# Patient Record
Sex: FEMALE | Race: WHITE | Employment: OTHER | URBAN - METROPOLITAN AREA
[De-identification: names, ages, dates, MRNs, and addresses within clinical notes are randomized per-mention and may not be internally consistent; named-entity substitution may affect disease eponyms.]

---

## 2017-01-31 ENCOUNTER — OFFICE VISIT - RIVER FALLS (OUTPATIENT)
Dept: FAMILY MEDICINE | Facility: CLINIC | Age: 72
End: 2017-01-31

## 2017-11-14 ENCOUNTER — OFFICE VISIT - RIVER FALLS (OUTPATIENT)
Dept: FAMILY MEDICINE | Facility: CLINIC | Age: 72
End: 2017-11-14

## 2017-11-14 ASSESSMENT — MIFFLIN-ST. JEOR: SCORE: 1349.44

## 2017-11-30 ENCOUNTER — OFFICE VISIT - RIVER FALLS (OUTPATIENT)
Dept: FAMILY MEDICINE | Facility: CLINIC | Age: 72
End: 2017-11-30

## 2017-11-30 ASSESSMENT — MIFFLIN-ST. JEOR: SCORE: 1344.91

## 2017-12-01 LAB
CREAT SERPL-MCNC: 0.77 MG/DL (ref 0.6–0.93)
GLUCOSE BLD-MCNC: 89 MG/DL (ref 65–99)

## 2017-12-20 ENCOUNTER — OFFICE VISIT - RIVER FALLS (OUTPATIENT)
Dept: FAMILY MEDICINE | Facility: CLINIC | Age: 72
End: 2017-12-20

## 2017-12-20 ASSESSMENT — MIFFLIN-ST. JEOR: SCORE: 1362.14

## 2017-12-27 ENCOUNTER — OFFICE VISIT - RIVER FALLS (OUTPATIENT)
Dept: FAMILY MEDICINE | Facility: CLINIC | Age: 72
End: 2017-12-27

## 2017-12-27 ASSESSMENT — MIFFLIN-ST. JEOR: SCORE: 1344.91

## 2021-08-19 ENCOUNTER — OFFICE VISIT - RIVER FALLS (OUTPATIENT)
Dept: FAMILY MEDICINE | Facility: CLINIC | Age: 76
End: 2021-08-19

## 2021-08-19 LAB
BUN SERPL-MCNC: 15 MG/DL (ref 7–25)
BUN/CREAT RATIO - HISTORICAL: NORMAL (ref 6–22)
CALCIUM SERPL-MCNC: 9.2 MG/DL (ref 8.6–10.4)
CHLORIDE BLD-SCNC: 104 MMOL/L (ref 98–110)
CO2 SERPL-SCNC: 27 MMOL/L (ref 20–32)
CREAT SERPL-MCNC: 0.84 MG/DL (ref 0.6–0.93)
EGFRCR SERPLBLD CKD-EPI 2021: 69 ML/MIN/1.73M2
ERYTHROCYTE [DISTWIDTH] IN BLOOD BY AUTOMATED COUNT: 14.5 % (ref 11–15)
GLUCOSE BLD-MCNC: 78 MG/DL (ref 65–99)
HCT VFR BLD AUTO: 42 % (ref 35–45)
HGB BLD-MCNC: 14.3 GM/DL (ref 11.7–15.5)
MCH RBC QN AUTO: 29.7 PG (ref 27–33)
MCHC RBC AUTO-ENTMCNC: 34 GM/DL (ref 32–36)
MCV RBC AUTO: 87.1 FL (ref 80–100)
PLATELET # BLD AUTO: 153 10*3/UL (ref 140–400)
PMV BLD: 9.9 FL (ref 7.5–12.5)
POTASSIUM BLD-SCNC: 3.9 MMOL/L (ref 3.5–5.3)
RBC # BLD AUTO: 4.82 10*6/UL (ref 3.8–5.1)
SODIUM SERPL-SCNC: 138 MMOL/L (ref 135–146)
WBC # BLD AUTO: 6.2 10*3/UL (ref 3.8–10.8)

## 2021-08-19 ASSESSMENT — MIFFLIN-ST. JEOR: SCORE: 1342.76

## 2021-10-07 ENCOUNTER — OFFICE VISIT - RIVER FALLS (OUTPATIENT)
Dept: FAMILY MEDICINE | Facility: CLINIC | Age: 76
End: 2021-10-07

## 2021-10-07 ENCOUNTER — LAB REQUISITION (OUTPATIENT)
Dept: LAB | Facility: CLINIC | Age: 76
End: 2021-10-07
Payer: MEDICARE

## 2021-10-07 ENCOUNTER — COMMUNICATION - RIVER FALLS (OUTPATIENT)
Dept: FAMILY MEDICINE | Facility: CLINIC | Age: 76
End: 2021-10-07

## 2021-10-07 DIAGNOSIS — U07.1 COVID-19: ICD-10-CM

## 2021-10-07 PROCEDURE — U0003 INFECTIOUS AGENT DETECTION BY NUCLEIC ACID (DNA OR RNA); SEVERE ACUTE RESPIRATORY SYNDROME CORONAVIRUS 2 (SARS-COV-2) (CORONAVIRUS DISEASE [COVID-19]), AMPLIFIED PROBE TECHNIQUE, MAKING USE OF HIGH THROUGHPUT TECHNOLOGIES AS DESCRIBED BY CMS-2020-01-R: HCPCS | Mod: ORL | Performed by: FAMILY MEDICINE

## 2021-10-07 ASSESSMENT — MIFFLIN-ST. JEOR: SCORE: 1356.37

## 2021-10-08 LAB — SARS-COV-2 RNA RESP QL NAA+PROBE: NEGATIVE

## 2021-10-11 LAB — SARS-COV-2 RNA RESP QL NAA+PROBE: NEGATIVE

## 2021-11-22 ENCOUNTER — TELEPHONE (OUTPATIENT)
Dept: CARDIOLOGY | Facility: CLINIC | Age: 76
End: 2021-11-22
Payer: COMMERCIAL

## 2021-11-22 NOTE — TELEPHONE ENCOUNTER
----- Message from Alessandra Rucker sent at 11/22/2021  3:24 PM CST -----  Regarding: RE: NEW PATIENT REFERRAL  Patient lives in [place] and might try to find care closer to home.  She has my contact if she decides to schedule.  ----- Message -----  From: Mirna Butts RN  Sent: 11/16/2021   9:59 AM CST  To: Alessandra Rucker  Subject: FW: NEW PATIENT REFERRAL                         Okay to schedule into valve clinic. Thanks!    Mirna   ----- Message -----  From: Beth Isaac  Sent: 11/16/2021   8:42 AM CST  To: [facility]  Subject: NEW PATIENT REFERRAL                             NEW PATIENT REFERRAL, DX: AORTIC STENOSIS. THANK YOU, BETH ISAAC.

## 2021-12-07 DIAGNOSIS — I35.0 AORTIC STENOSIS: Primary | ICD-10-CM

## 2021-12-09 ENCOUNTER — LAB (OUTPATIENT)
Dept: CARDIOLOGY | Facility: CLINIC | Age: 76
End: 2021-12-09
Payer: MEDICARE

## 2021-12-09 ENCOUNTER — ALLIED HEALTH/NURSE VISIT (OUTPATIENT)
Dept: CARDIOLOGY | Facility: CLINIC | Age: 76
End: 2021-12-09
Payer: MEDICARE

## 2021-12-09 ENCOUNTER — OFFICE VISIT (OUTPATIENT)
Dept: CARDIOLOGY | Facility: CLINIC | Age: 76
End: 2021-12-09
Payer: MEDICARE

## 2021-12-09 VITALS
SYSTOLIC BLOOD PRESSURE: 138 MMHG | HEIGHT: 62 IN | DIASTOLIC BLOOD PRESSURE: 68 MMHG | RESPIRATION RATE: 16 BRPM | WEIGHT: 202 LBS | HEART RATE: 79 BPM | BODY MASS INDEX: 37.17 KG/M2

## 2021-12-09 DIAGNOSIS — I35.0 NONRHEUMATIC AORTIC VALVE STENOSIS: Primary | ICD-10-CM

## 2021-12-09 DIAGNOSIS — R93.89 ABNORMAL FINDINGS ON DIAGNOSTIC IMAGING OF CARDIOVASCULAR SYSTEM: ICD-10-CM

## 2021-12-09 DIAGNOSIS — I35.0 AORTIC STENOSIS: Primary | ICD-10-CM

## 2021-12-09 DIAGNOSIS — I35.0 AORTIC STENOSIS: ICD-10-CM

## 2021-12-09 PROBLEM — H40.9 GLAUCOMA: Status: ACTIVE | Noted: 2017-12-12

## 2021-12-09 PROBLEM — Z90.49 S/P CHOLECYSTECTOMY: Status: ACTIVE | Noted: 2017-12-12

## 2021-12-09 PROBLEM — E66.01 MORBID OBESITY (H): Status: ACTIVE | Noted: 2017-12-12

## 2021-12-09 PROBLEM — E78.00 HYPERCHOLESTEROLEMIA: Status: ACTIVE | Noted: 2017-12-12

## 2021-12-09 LAB
ALBUMIN SERPL-MCNC: 4.1 G/DL (ref 3.5–5)
ALP SERPL-CCNC: 95 U/L (ref 45–120)
ALT SERPL W P-5'-P-CCNC: 19 U/L (ref 0–45)
ANION GAP SERPL CALCULATED.3IONS-SCNC: 10 MMOL/L (ref 5–18)
AST SERPL W P-5'-P-CCNC: 21 U/L (ref 0–40)
BILIRUB SERPL-MCNC: 2.4 MG/DL (ref 0–1)
BUN SERPL-MCNC: 13 MG/DL (ref 8–28)
CALCIUM SERPL-MCNC: 9.1 MG/DL (ref 8.5–10.5)
CHLORIDE BLD-SCNC: 106 MMOL/L (ref 98–107)
CO2 SERPL-SCNC: 24 MMOL/L (ref 22–31)
CREAT SERPL-MCNC: 0.81 MG/DL (ref 0.6–1.1)
ERYTHROCYTE [DISTWIDTH] IN BLOOD BY AUTOMATED COUNT: 14.4 % (ref 10–15)
GFR SERPL CREATININE-BSD FRML MDRD: 72 ML/MIN/1.73M2
GLUCOSE BLD-MCNC: 84 MG/DL (ref 70–125)
HCT VFR BLD AUTO: 43.5 % (ref 35–47)
HGB BLD-MCNC: 13.9 G/DL (ref 11.7–15.7)
MCH RBC QN AUTO: 28.6 PG (ref 26.5–33)
MCHC RBC AUTO-ENTMCNC: 32 G/DL (ref 31.5–36.5)
MCV RBC AUTO: 90 FL (ref 78–100)
PLATELET # BLD AUTO: 181 10E3/UL (ref 150–450)
POTASSIUM BLD-SCNC: 4.1 MMOL/L (ref 3.5–5)
PROT SERPL-MCNC: 7.5 G/DL (ref 6–8)
RBC # BLD AUTO: 4.86 10E6/UL (ref 3.8–5.2)
SODIUM SERPL-SCNC: 140 MMOL/L (ref 136–145)
WBC # BLD AUTO: 5.5 10E3/UL (ref 4–11)

## 2021-12-09 PROCEDURE — 99205 OFFICE O/P NEW HI 60 MIN: CPT | Performed by: INTERNAL MEDICINE

## 2021-12-09 PROCEDURE — 85027 COMPLETE CBC AUTOMATED: CPT

## 2021-12-09 PROCEDURE — 93000 ELECTROCARDIOGRAM COMPLETE: CPT | Performed by: INTERNAL MEDICINE

## 2021-12-09 PROCEDURE — 80053 COMPREHEN METABOLIC PANEL: CPT

## 2021-12-09 PROCEDURE — 99207 PR NO CHARGE NURSE ONLY: CPT

## 2021-12-09 PROCEDURE — 36415 COLL VENOUS BLD VENIPUNCTURE: CPT

## 2021-12-09 RX ORDER — FUROSEMIDE 20 MG
20 TABLET ORAL DAILY
Qty: 30 TABLET | Refills: 12 | Status: SHIPPED | OUTPATIENT
Start: 2021-12-09 | End: 2023-07-26

## 2021-12-09 ASSESSMENT — MIFFLIN-ST. JEOR: SCORE: 1369.52
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
## 2021-12-09 NOTE — NURSING NOTE
Valve Clinic - Aortic Stenosis  (See consult notes from Dr. Allen)    Referring provider: PCP  Rivera Hernandez    KCQ12 (date completed 21), scanned into chart    Serum albumin (date completed 21): pending  +5 meter walk: uses cane, falls risk  Soliz index (date completed 21): 6/6    frailty score: 1  Preliminary STS Score: 1.9%      Pt is . Retired office work. She had 2 children both . Pt lives with her mom in a single level home in [place], [place]. She is the primary caregiver for her mom. She uses a cane for balance. Pt reports MORALES and has bilateral LE edema.       PMH: HLD, Obesity, recent eyelid surgery        TTE date 21  EF 71% %  AV mean gradient: 37 mmHg  AV Peak Billy: 3.9 m/sec  AV area: 0.96 cm2  AV DIM IND VTI: 0.24  LV SVi: 45 ml/m2        Plan: Eval for possible TAVR. Schedule CT, heart cath and Surgeon consult. Pt will contact dentist for dental clearance. She is the primary caregiver for her mom and states that she is very busy with appointments for her mom.    Reviewed AS/TAVR education information with patient. No further questions at this time. Patient has my direct contact information and was encouraged to call with questions or concerns.           Cory Preciado RN  [facility]  [facility]  Phone: [phone]  Fax: [phone]

## 2021-12-10 LAB
ATRIAL RATE - MUSE: 81 BPM
DIASTOLIC BLOOD PRESSURE - MUSE: NORMAL MMHG
INTERPRETATION ECG - MUSE: NORMAL
P AXIS - MUSE: 31 DEGREES
PR INTERVAL - MUSE: 172 MS
QRS DURATION - MUSE: 92 MS
QT - MUSE: 390 MS
QTC - MUSE: 453 MS
R AXIS - MUSE: -40 DEGREES
SYSTOLIC BLOOD PRESSURE - MUSE: NORMAL MMHG
T AXIS - MUSE: 69 DEGREES
VENTRICULAR RATE- MUSE: 81 BPM

## 2022-02-11 VITALS
BODY MASS INDEX: 37.61 KG/M2 | SYSTOLIC BLOOD PRESSURE: 138 MMHG | HEIGHT: 61 IN | HEIGHT: 61 IN | WEIGHT: 203 LBS | TEMPERATURE: 97.2 F | HEART RATE: 80 BPM | BODY MASS INDEX: 38.33 KG/M2 | SYSTOLIC BLOOD PRESSURE: 132 MMHG | DIASTOLIC BLOOD PRESSURE: 76 MMHG | HEART RATE: 84 BPM | TEMPERATURE: 98.8 F | DIASTOLIC BLOOD PRESSURE: 72 MMHG | WEIGHT: 199.2 LBS

## 2022-02-11 VITALS
DIASTOLIC BLOOD PRESSURE: 82 MMHG | TEMPERATURE: 97.3 F | BODY MASS INDEX: 37.8 KG/M2 | HEART RATE: 80 BPM | SYSTOLIC BLOOD PRESSURE: 132 MMHG | HEIGHT: 61 IN | HEIGHT: 61 IN | DIASTOLIC BLOOD PRESSURE: 68 MMHG | TEMPERATURE: 97.7 F | BODY MASS INDEX: 37.61 KG/M2 | HEART RATE: 72 BPM | WEIGHT: 200.2 LBS | SYSTOLIC BLOOD PRESSURE: 124 MMHG | WEIGHT: 199.2 LBS

## 2022-02-11 VITALS
HEART RATE: 70 BPM | HEIGHT: 61 IN | TEMPERATURE: 97.6 F | WEIGHT: 202.6 LBS | SYSTOLIC BLOOD PRESSURE: 118 MMHG | HEART RATE: 70 BPM | BODY MASS INDEX: 38.25 KG/M2 | OXYGEN SATURATION: 98 % | DIASTOLIC BLOOD PRESSURE: 74 MMHG | SYSTOLIC BLOOD PRESSURE: 118 MMHG | HEIGHT: 61 IN | BODY MASS INDEX: 37.69 KG/M2 | WEIGHT: 199.6 LBS | DIASTOLIC BLOOD PRESSURE: 82 MMHG

## 2022-02-16 NOTE — TELEPHONE ENCOUNTER
---------------------  From: Amber Dupont CMA   To: Phone Messages Pool ([id number] - [place]);     Sent: 8/19/2021 11:35:25 AM CDT  Subject: C-19 testing prior to surgery?     LDVMFCB w/ surgery center @ [facility] asking for update on covid testing prior to procedures at [facility], specifically regarding patient. Scheduled surgery on 8/24/21 w/ Ubaldo SPRINGER on covid vaccines.Steffanie @ [facility] called stating starting this week, everyone who is scheduled for surgery has to be covid tested within 3-5 days of surgery. [facility] informed. They will contact patient to assist in scheduling swabbing through them.

## 2022-02-16 NOTE — PROGRESS NOTES
Patient:   KINGSLEY FRIAS            MRN: [id number]            FIN: [id number]               Age:   72 years     Sex:  Female     :  1945   Associated Diagnoses:   Degenerative joint disease (DJD) of hip   Author:   Rivera Hernandez MD      Chief Complaint   2017 11:35 AM CST  Requesting a letter for post office to deliver mail to house     Chief complaint and symptoms noted above confirmed with patient.      Subjective   Chief complaint 2017 11:35 AM CST  Requesting a letter for post office to deliver mail to house  .  Additional information Having problems with DJD.        Health Status   Allergies:    Allergic Reactions (Selected)  No known allergies  No Known Medication Allergies   Medications:  (Selected)   Documented Medications  Documented  Calcium 600+D: po, daily, 0 Refill(s)  Cosopt ophthalmic solution: 1 drop(s), Both eyes, BID, 0 Refill(s)  Xalatan: Both eyes, BID, 0 Refill(s)   Problem list:    All Problems  Glaucoma / ICD-9-.9 / Confirmed  Elevated cholesterol / SNOMED CT 45801651 / Confirmed  Obesity / SNOMED CT 947498521 / Confirmed      Objective   Vital Signs   2017 11:35 AM CST Temperature Tympanic 97.7 DegF  LOW    Peripheral Pulse Rate 72 bpm    Pulse Site Radial artery    HR Method Manual    Systolic Blood Pressure 124 mmHg    Diastolic Blood Pressure 82 mmHg    Mean Arterial Pressure 96 mmHg    BP Site Left arm    BP Method Manual      Measurements from flowsheet : Measurements   2017 11:35 AM CST Height Measured - Standard 61.25 in    Weight Measured - Standard 200.2 lb    BSA 1.98 m2    Body Mass Index 37.52 kg/m2      General:  Alert and oriented, No acute distress.    Musculoskeletal:       Lower extremity exam: Hip ( Bilateral, Tenderness, Uses cane to walk ).    Integumentary:  Warm, Dry, Pink.       Impression and Plan   Assessment and Plan:          Diagnosis: Degenerative joint disease (DJD) of hip (XXE55-EO M16.9).         Course:  Progressing as expected.    Orders     Please see copy of dictated letter..     .    Assessment and Plan:  Orders     More than half of a 15 minute visit spent on counseling the above problems..     .

## 2022-02-16 NOTE — NURSING NOTE
Comprehensive Intake Entered On:  8/19/2021 11:17 AM CDT    Performed On:  8/19/2021 11:07 AM CDT by Christoph Veliz LPN               Summary   Chief Complaint :   [facility] DOS: [facility]24-21    Advance Directive :   Yes   Weight Measured :   199.6 lb(Converted to: 199 lb 10 oz, 90.537 kg)    Height Measured :   61 in(Converted to: 5 ft 1 in, 154.94 cm)    Body Mass Index :   37.71 kg/m2 (HI)    Body Surface Area :   1.97 m2   Systolic Blood Pressure :   118 mmHg   Diastolic Blood Pressure :   82 mmHg (HI)    Mean Arterial Pressure :   94 mmHg   Peripheral Pulse Rate :   70 bpm   BP Site :   Right arm   Pulse Site :   Radial artery   BP Method :   Manual   HR Method :   Manual   Christoph Veliz LPN - 8/19/2021 11:07 AM CDT   Consents   Consent for Immunization Exchange :   Consent Granted   Consent for Immunizations to Providers :   Consent Granted   Christoph Veliz LPN - 8/19/2021 11:07 AM CDT   Meds / Allergies   (As Of: 8/19/2021 11:17:09 AM CDT)   Allergies (Active)   No known allergies  Estimated Onset Date:   Unspecified ; Created By:   Irene Dunn CMA; Reaction Status:   Active ; Category:   Drug ; Substance:   No known allergies ; Type:   Allergy ; Updated By:   Irene Dunn CMA; Reviewed Date:   8/19/2021 11:08 AM CDT      No Known Medication Allergies  Estimated Onset Date:   Unspecified ; Created By:   Irene Dunn CMA; Reaction Status:   Active ; Category:   Drug ; Substance:   No Known Medication Allergies ; Type:   Allergy ; Updated By:   Irene Dunn CMA; Reviewed Date:   8/19/2021 11:08 AM CDT        Medication List   (As Of: 8/19/2021 11:17:09 AM CDT)   Prescription/Discharge Order    cholestyramine  :   cholestyramine ; Status:   Completed ; Ordered As Mnemonic:   cholestyramine 4 g/5 g oral powder for reconstitution ; Simple Display Line:   5 g, PO, BID, 210 g, 5 Refill(s) ; Ordering Provider:   Rivera Hernandez MD; Catalog Code:   cholestyramine  ; Order Dt/Tm:   12/20/2017 10:21:42 AM CST            Home Meds    calcium-vitamin D  :   calcium-vitamin D ; Status:   Processing ; Ordered As Mnemonic:   Calcium 600+D ; Action Display:   Complete ; Catalog Code:   calcium-vitamin D ; Order Dt/Tm:   8/19/2021 11:14:51 AM CDT          dorzolamide-timolol ophthalmic  :   dorzolamide-timolol ophthalmic ; Status:   Documented ; Ordered As Mnemonic:   Cosopt ophthalmic solution ; Simple Display Line:   1 drop(s), Both eyes, BID ; Catalog Code:   dorzolamide-timolol ophthalmic ; Order Dt/Tm:   4/8/2010 10:55:22 AM CDT          latanoprost ophthalmic  :   latanoprost ophthalmic ; Status:   Documented ; Ordered As Mnemonic:   Xalatan ; Simple Display Line:   Both eyes, BID ; Catalog Code:   latanoprost ophthalmic ; Order Dt/Tm:   4/8/2010 10:55:14 AM CDT            Social History   Social History   (As Of: 8/19/2021 11:17:09 AM CDT)   Alcohol:  Denies Alcohol Use      Never   (Last Updated: 4/24/2013 3:09:04 PM CDT by Radha Garay)          Tobacco:  Denies Tobacco Use      Never (less than 100 in lifetime)   (Last Updated: 8/19/2021 11:07:54 AM CDT by Christoph Veliz LPN)          Electronic Cigarette/Vaping:        Electronic Cigarette Use: Never.   (Last Updated: 8/19/2021 11:07:58 AM CDT by Christoph Veliz LPN)          Substance Abuse:  Denies Substance Abuse      Never   (Last Updated: 4/24/2013 3:09:11 PM CDT by Radha Garay)          Employment/School:        Retired   (Last Updated: 4/8/2010 11:17:40 AM CDT by Meagan Leavitt MD)          Home/Environment:        Lives with Self.  Living situation: Home/Independent.   (Last Updated: 4/8/2010 11:18:29 AM CDT by Meagan Leavitt MD)          Nutrition/Health:        Type of diet: Regular.   (Last Updated: 4/24/2013 3:09:43 PM CDT by Radha Garay)          Exercise:  Does not exercise      Exercise frequency: ..   (Last Updated: 4/24/2013 3:09:28 PM CDT by Radha Garay)          Sexual:        Sexually  active: No.  Sexual orientation: Heterosexual.   (Last Updated: 4/24/2013 3:09:57 PM CDT by Radha Garay)

## 2022-02-16 NOTE — PROGRESS NOTES
Patient:   KINGSLEY FRIAS            MRN: [id number]            FIN: [id number]               Age:   72 years     Sex:  Female     :  1945   Associated Diagnoses:   History of hemicolectomy   Author:   Rivera Hernandez MD      Chief Complaint   2017 9:52 AM CST   f/u colon surgery at [facility] last week        Subjective   Chief complaint 2017 9:52 AM CST   f/u colon surgery at [facility] last week   Noted as above. Discussed with Patient..        Health Status   Allergies:    Allergic Reactions (Selected)  No known allergies  No Known Medication Allergies   Medications:  (Selected)   Documented Medications  Documented  Calcium 600+D: po, daily, 0 Refill(s)  Cosopt ophthalmic solution: 1 drop(s), Both eyes, BID, 0 Refill(s)  Xalatan: Both eyes, BID, 0 Refill(s)   Problem list:    All Problems  Glaucoma / ICD-9-.9 / Confirmed  Elevated cholesterol / SNOMED CT 92201296 / Confirmed  Obesity / SNOMED CT 347366162 / Confirmed      Objective   Vital Signs   2017 9:52 AM CST Temperature Tympanic 98.8 DegF    Peripheral Pulse Rate 84 bpm    Pulse Site Radial artery    HR Method Manual    Systolic Blood Pressure 138 mmHg  HI    Diastolic Blood Pressure 76 mmHg    Mean Arterial Pressure 97 mmHg    BP Site Left arm    BP Method Manual      Measurements from flowsheet : Measurements   2017 9:52 AM CST Height Measured - Standard 61.25 in    Weight Measured - Standard 203 lb    BSA 1.99 m2    Body Mass Index 38.04 kg/m2      General:  Alert and oriented, No acute distress.    Neck:  Supple.    Respiratory:  Lungs are clear to auscultation.    Cardiovascular:  Normal rate, Regular rhythm.    Gastrointestinal:  Soft, Non-tender, Non-distended, Normal bowel sounds.    Integumentary:  Warm, Dry, Pink, large 25 cm incision in abdomen.  1/2 staples removed..       Results Review   Results review   Lab results   2017 11:34 AM CST Sodium Level 138 mmol/L    Potassium Level 4.5 mmol/L    Chloride Level  105 mmol/L    CO2 Level 28 mmol/L    Glucose Level 89 mg/dL    BUN 16 mg/dL    Creatinine 0.77 mg/dL    BUN/Creat Ratio NOT APPLICABLE    eGFR 78 mL/min/1.73m2    eGFR African American 91 mL/min/1.73m2    Calcium Level 9.2 mg/dL    WBC 6.5    RBC 4.90    Hgb 14.0 gm/dL    Hct 42.4 %    MCV 86.5 fL    MCH 28.6 pg    MCHC 33.0 gm/dL    RDW 13.6 %    Platelet 168    MPV 10.8 fL         Impression and Plan   Assessment and Plan:          Diagnosis: History of hemicolectomy (TKP62-OI Z98.890).    Orders     Orders   Pharmacy:  cholestyramine 4 g/5 g oral powder for reconstitution (Prescribe): 5 g, PO, BID, # 210 g, 5 Refill(s), Type: Maintenance, Pharmacy: [facility] #[id number], 5 gm po bid  Requests (Return to Office):  Return to Clinic (Request) (Order): Return in 1 week.     .    Assessment and Plan:  Orders     More than half of a 15 minute visit spent on counseling the above problems..     .
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
## 2022-02-16 NOTE — TELEPHONE ENCOUNTER
---------------------  From: Ness Briones LPN   To: Referral Coordinators Pool ([id number]_[facility]);     Sent: 10/7/2021 12:47:13 PM CDT  Subject: Imaging Order       Order has been placed for patient to have an echocardiogram.

## 2022-02-16 NOTE — PROGRESS NOTES
Patient:   KINGSLEY FRIAS            MRN: [id number]            FIN: [id number]               Age:   72 years     Sex:  Female     :  1945   Associated Diagnoses:   Visit for suture removal   Author:   Rivera Hernandez MD      Results Review   General results   Today's results   2017 10:18 AM CST Height Measured - Standard 61.25 in    Weight Measured - Standard 199.2 lb    BSA 1.97 m2    Body Mass Index 37.33 kg/m2    Temperature Tympanic 97.2 DegF  LOW    Peripheral Pulse Rate 80 bpm    Pulse Site Radial artery    HR Method Manual    Systolic Blood Pressure 132 mmHg  HI    Diastolic Blood Pressure 72 mmHg    Mean Arterial Pressure 92 mmHg    BP Site Left arm    BP Method Manual    Allergies Verified? Yes    Medication History Verified? Yes    Tobacco Use? Never smoker    Advance Directive Yes    Chief Complaint Suture removal    Comprehensive Intake Form Comprehensive Intake Form    Intake Form Comprehensive Intake         Health Status   Allergies:    Allergic Reactions (Selected)  No known allergies  No Known Medication Allergies   Medications:  (Selected)   Prescriptions  Prescribed  cholestyramine 4 g/5 g oral powder for reconstitution: 5 g, PO, BID, # 210 g, 5 Refill(s), Type: Maintenance, Pharmacy: "[facility] #[id number], 5 gm po bid  Documented Medications  Documented  Calcium 600+D: po, daily, 0 Refill(s)  Cosopt ophthalmic solution: 1 drop(s), Both eyes, BID, 0 Refill(s)  Xalatan: Both eyes, BID, 0 Refill(s)   Problem list:    All Problems  Glaucoma / ICD-9-.9 / Confirmed  Elevated cholesterol / SNOMED CT 59565074 / Confirmed  Obesity / SNOMED CT 007942864 / Confirmed      Subjective   Problem:  Here for suture removal      Objective   Vital Signs   2017 10:18 AM CST Temperature Tympanic 97.2 DegF  LOW    Peripheral Pulse Rate 80 bpm    Pulse Site Radial artery    HR Method Manual    Systolic Blood Pressure 132 mmHg  HI    Diastolic Blood Pressure 72 mmHg    Mean Arterial  Pressure 92 mmHg    BP Site Left arm    BP Method Manual      Integumentary:  Sutures removed and Steri-strips applied to excision area without problems..    Reviewed surgical results.      Plan   Impression and Plan:     Diagnosis     Visit for suture removal (EZT22-JX Z48.02).     .    Condition stable.    Orders     F/U  if not improving, sooner if getting worse.

## 2022-02-16 NOTE — NURSING NOTE
Comprehensive Intake Entered On:  10/7/2021 10:24 AM CDT    Performed On:  10/7/2021 10:20 AM CDT by Ness Briones LPN               Summary   Chief Complaint :   pre-op exam; right eyelid surgery scheduled 10/13/21 with Dr Dickinson at [facility], needs COVID testing done, says that it can be 5-7 days prior to procedure   Advance Directive :   Yes   Weight Measured :   202.6 lb(Converted to: 202 lb 10 oz, 91.898 kg)    Height Measured :   61 in(Converted to: 5 ft 1 in, 154.94 cm)    Body Mass Index :   38.28 kg/m2 (HI)    Body Surface Area :   1.99 m2   Systolic Blood Pressure :   118 mmHg   Diastolic Blood Pressure :   74 mmHg   Mean Arterial Pressure :   89 mmHg   Peripheral Pulse Rate :   70 bpm   BP Site :   Right arm   BP Method :   Manual   Temperature Tympanic :   97.6 DegF(Converted to: 36.4 DegC)  (LOW)    Oxygen Saturation :   98 %   Ness Briones LPN - 10/7/2021 10:20 AM CDT   Health Status   Allergies Verified? :   Yes   Medication History Verified? :   Yes   Medical History Verified? :   No   Pre-Visit Planning Status :   Completed   Tobacco Use? :   Never smoker   Ness Briones LPN - 10/7/2021 10:20 AM CDT   Meds / Allergies   (As Of: 10/7/2021 10:24:20 AM CDT)   Allergies (Active)   No Known Medication Allergies  Estimated Onset Date:   Unspecified ; Created By:   Irene Dunn CMA; Reaction Status:   Active ; Category:   Drug ; Substance:   No Known Medication Allergies ; Type:   Allergy ; Updated By:   Irene Dunn CMA; Reviewed Date:   8/19/2021 11:16 AM CDT        Medication List   (As Of: 10/7/2021 10:24:20 AM CDT)
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
## 2022-02-16 NOTE — NURSING NOTE
Phone Message    PCP:   JULIAN      Time of Call:  11:07 am    Phone number:  [phone]    Returned call at: 11:40 am    Note:   Pt is requesting a letter to be written by JULIAN to give to the post office to have her mailbox brought closer to her house. Her children are worried about her falling during the winter due to her long driveway and that she walks with a cane. I advised that it has been over a year since she has been seen and that an office visit would be required to discuss this situation prior to writing the letter. Pt agreed. Apt made    Pharmacy: n/a    Last office visit and reason: 10/07/16; bee sting    Transferred to: n/a

## 2022-02-16 NOTE — LETTER
(Inserted Image. Unable to display)   [address] [facility] [address]  [phone] (phone) [phone] (fax)  November 10, 2021        KINGSLEY FRIAS  [address] [place] [address]      Dear KINGSLEY,      Thank you for selecting [facility] for your [facility] needs.      I am trying to reach you about your echocardiogram results. If I have not reached you by phone, please call the clinic at [phone].      Please contact me or my assistant at [phone] if you have any questions or concerns.     Sincerely,      Meagan Leavitt MD
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
## 2022-02-16 NOTE — LETTER
(Inserted Image. Unable to display)   [address] [facility] [address]  [phone] (phone) [phone] (fax)  October 12, 2021        KINGSLEY FRIAS  [address] [place] [address]      Dear KINGSLEY,      Thank you for selecting [facility] for your heatlare needs.      Your lab results showed a negative COVID test which was faxed to the hospital. We tried to reach you multiple times at [phone]. If there is a better number to contact you, please call the clinic to update your phone number.      Please contact me or my assistant at [phone] if you have any questions or concerns.     Sincerely,      Meagan Leavitt MD

## 2022-02-16 NOTE — TELEPHONE ENCOUNTER
---------------------  From: Flor Dudley CMA   To: [employer] Pool ([address]_[facility]);     Sent: 10/9/2021 9:58:20 AM CDT  Subject: COVID test result- Neg     Tried calling pt to let her know that negative for COVID, no answer and unable to leave a message. Result faxed to [facility] [phone].Noted.  Please try her again one more time later today. Thanks---------------------  From: Meagan Leavitt MD ([employer] Pool ([address]224_[facility]))   To: DearJane (32[address]_[facility]);     Sent: 10/11/2021 8:03:26 AM CDT  Subject: FW: COVID test result- [id number] Tried to contact patient again, phone just keeps ringing without a VM option.Letter sent

## 2023-07-26 ENCOUNTER — OFFICE VISIT (OUTPATIENT)
Dept: FAMILY MEDICINE | Facility: CLINIC | Age: 78
End: 2023-07-26
Payer: COMMERCIAL

## 2023-07-26 VITALS
HEIGHT: 62 IN | DIASTOLIC BLOOD PRESSURE: 66 MMHG | WEIGHT: 188.7 LBS | SYSTOLIC BLOOD PRESSURE: 130 MMHG | TEMPERATURE: 98.1 F | HEART RATE: 72 BPM | OXYGEN SATURATION: 97 % | BODY MASS INDEX: 34.72 KG/M2

## 2023-07-26 DIAGNOSIS — H61.23 BILATERAL IMPACTED CERUMEN: Primary | ICD-10-CM

## 2023-07-26 PROCEDURE — 69209 REMOVE IMPACTED EAR WAX UNI: CPT | Mod: 50 | Performed by: PHYSICIAN ASSISTANT

## 2023-07-26 RX ORDER — LATANOPROST 50 UG/ML
1 SOLUTION/ DROPS OPHTHALMIC 2 TIMES DAILY
COMMUNITY
Start: 2023-01-27

## 2023-07-26 NOTE — PROGRESS NOTES
"Assessment & Plan     Bilateral impacted cerumen  - [place] REMOVAL IMPACTED CERUMEN IRRIGATION/LVG UNILAT  Removal via lavage by css. Pt tolerated well. Follow-up prn.       TIESHA Doyle [facility]    Chuyita Salazar is a 77 year old female who presents to clinic today for the following health issues:  Chief Complaint   Patient presents with     Ear Problem     Trouble with hearing. Was told there is wax build up when pt was getting fitted for hearing aids.     History of Present Illness       Reason for visit:  Ear cleaning     Pt presents for wax in the ears. She went to get evaluated for hearing aids and the audiologist indicated that she needed ears cleaned first.    No pain.    Hearing is decreased.            Objective    /66 (BP Location: Right arm, Patient Position: Sitting, Cuff Size: Adult Large)   Pulse 72   Temp 98.1  F (36.7  C) (Oral)   Ht 1.575 m (5' 2\")   Wt 85.6 kg (188 lb 11.2 oz)   LMP  (LMP Unknown)   SpO2 97%   BMI 34.51 kg/m    Physical Exam   Ears obstructed with cerumen B. After ear lavage, ears patent, TM intact, noninjected.            "

## 2023-10-23 NOTE — TELEPHONE ENCOUNTER
---------------------  From: Meagan Leavitt MD   To: Meagan Leavitt MD;     Sent: 11/10/2021 1:06:10 PM CST  Subject: abnormal echo     Attempted to reach patient about echocardiogram.    Echo shows aortic stenosis which is narrowing of one of her valves in her heart. I am going to recommend a cardiology follow up. I will try her again later.No answer again at 1530. Letter printed and sent.Called again 11/12/21. No answer.  ** Submitted: **  Order:Referral (Request)  please call [phone] to schedule. No voice mail available. Please let us know if you can't reach her. Clinic is [phone].  Details:  11/15/2021 9:20 AM CST, Referred to: Cardiology, Referred to: [facility] - requests cardiology consult in [place], Reason for referral: aortic stenosis, new diagnosis on echo done at [facility] November 2021, Additional instructions: please call [phone] to schedule. No voice mail available. Please let us know if you can't reach her.   in is [phone]., Aortic stenosis       Signed by Meagan Leavitt MD  11/15/2021 3:20:00 PM UTCTalked with patient. Agrees with plan. Asks to be seen in [place]. Prefers they call her to schedule. Told her that if she misses a call from cardiology to let us know so we can help connect them.---------------------  From: Meagan Leavitt MD   To: Referral Coordinators Pool ([facility]); [employer] Message Pool ([address]);     Sent: 11/15/2021 9:23:41 AM CST  Subject: FW: abnormal echoOrder faxed to [facility] with req for [place]   No care due was identified.  [facility] Embedded Care Due Messages. Reference number: [id number].   10/23/2023 10:39:11 AM CDT

## 2024-07-09 ENCOUNTER — OFFICE VISIT (OUTPATIENT)
Dept: FAMILY MEDICINE | Facility: CLINIC | Age: 79
End: 2024-07-09
Payer: COMMERCIAL

## 2024-07-09 VITALS
BODY MASS INDEX: 35.7 KG/M2 | OXYGEN SATURATION: 98 % | RESPIRATION RATE: 20 BRPM | TEMPERATURE: 98.4 F | DIASTOLIC BLOOD PRESSURE: 70 MMHG | SYSTOLIC BLOOD PRESSURE: 128 MMHG | WEIGHT: 194 LBS | HEART RATE: 72 BPM | HEIGHT: 62 IN

## 2024-07-09 DIAGNOSIS — E78.00 HYPERCHOLESTEROLEMIA: ICD-10-CM

## 2024-07-09 DIAGNOSIS — Z01.818 PREOP GENERAL PHYSICAL EXAM: ICD-10-CM

## 2024-07-09 DIAGNOSIS — H02.539 LID LAG: ICD-10-CM

## 2024-07-09 DIAGNOSIS — H40.1110 PRIMARY OPEN ANGLE GLAUCOMA OF RIGHT EYE, UNSPECIFIED GLAUCOMA STAGE: Primary | ICD-10-CM

## 2024-07-09 DIAGNOSIS — I35.0 AORTIC VALVE STENOSIS, ETIOLOGY OF CARDIAC VALVE DISEASE UNSPECIFIED: ICD-10-CM

## 2024-07-09 DIAGNOSIS — E66.01 MORBID OBESITY (H): ICD-10-CM

## 2024-07-09 PROBLEM — K63.89 MASS OF COLON: Status: ACTIVE | Noted: 2024-07-09

## 2024-07-09 PROBLEM — H25.811 COMBINED FORM OF SENILE CATARACT OF RIGHT EYE: Status: ACTIVE | Noted: 2021-07-27

## 2024-07-09 PROBLEM — Z90.49 HISTORY OF COLECTOMY: Status: ACTIVE | Noted: 2017-12-12

## 2024-07-09 LAB
ANION GAP SERPL CALCULATED.3IONS-SCNC: 8 MMOL/L (ref 7–15)
BUN SERPL-MCNC: 13.3 MG/DL (ref 8–23)
CALCIUM SERPL-MCNC: 9.2 MG/DL (ref 8.8–10.2)
CHLORIDE SERPL-SCNC: 104 MMOL/L (ref 98–107)
CREAT SERPL-MCNC: 0.9 MG/DL (ref 0.51–0.95)
DEPRECATED HCO3 PLAS-SCNC: 26 MMOL/L (ref 22–29)
EGFRCR SERPLBLD CKD-EPI 2021: 66 ML/MIN/1.73M2
ERYTHROCYTE [DISTWIDTH] IN BLOOD BY AUTOMATED COUNT: 14.2 % (ref 10–15)
GLUCOSE SERPL-MCNC: 86 MG/DL (ref 70–99)
HCT VFR BLD AUTO: 44.6 % (ref 35–47)
HGB BLD-MCNC: 14.1 G/DL (ref 11.7–15.7)
MCH RBC QN AUTO: 28.4 PG (ref 26.5–33)
MCHC RBC AUTO-ENTMCNC: 31.6 G/DL (ref 31.5–36.5)
MCV RBC AUTO: 90 FL (ref 78–100)
PLATELET # BLD AUTO: 164 10E3/UL (ref 150–450)
POTASSIUM SERPL-SCNC: 4.5 MMOL/L (ref 3.4–5.3)
RBC # BLD AUTO: 4.97 10E6/UL (ref 3.8–5.2)
SODIUM SERPL-SCNC: 138 MMOL/L (ref 135–145)
WBC # BLD AUTO: 7 10E3/UL (ref 4–11)

## 2024-07-09 PROCEDURE — 99214 OFFICE O/P EST MOD 30 MIN: CPT | Performed by: FAMILY MEDICINE

## 2024-07-09 PROCEDURE — 85027 COMPLETE CBC AUTOMATED: CPT | Performed by: FAMILY MEDICINE

## 2024-07-09 PROCEDURE — 36415 COLL VENOUS BLD VENIPUNCTURE: CPT | Performed by: FAMILY MEDICINE

## 2024-07-09 PROCEDURE — 80048 BASIC METABOLIC PNL TOTAL CA: CPT | Performed by: FAMILY MEDICINE

## 2024-07-09 RX ORDER — TIMOLOL MALEATE 5 MG/ML
1 SOLUTION/ DROPS OPHTHALMIC EVERY MORNING
COMMUNITY
Start: 2024-04-22

## 2024-07-09 NOTE — PROGRESS NOTES
Preoperative Evaluation  [facility]  [address] [facility] [address]  Phone: [phone]  Fax: [phone]  Primary Provider: Rivera Hernandez MD  Pre-op Performing Provider: Rivera Hernandez MD  Jul 9, 2024 7/9/2024   Surgical Information   What procedure is being done? Eyelid lift of right eye and scar tissure   Facility or Hospital where procedure/surgery will be performed: [facility]   Who is doing the procedure / surgery? Dr Dickinson   Date of surgery / procedure: 7/24/2024   Time of surgery / procedure: TBD   Where do you plan to recover after surgery? at home alone        Fax number for surgical facility: Note does not need to be faxed, will be available electronically in Epic.    Assessment & Plan     The proposed surgical procedure is considered LOW risk.    Lid lag  Preop general physical exam    Morbid obesity (H)    Primary open angle glaucoma of right eye, unspecified glaucoma stage    Hypercholesterolemia    Aortic Stenosis        - CBC with platelets; Future  - Basic metabolic panel  (Ca, Cl, CO2, Creat, Gluc, K, Na, BUN); Future            - No identified additional risk factors other than previously addressed    Antiplatelet or Anticoagulation Medication Instructions   - aspirin: Discontinue aspirin 7-10 days prior to procedure to reduce bleeding risk. It should be resumed postoperatively. She only takes PRN.    Additional Medication Instructions  Take all scheduled medications on the day of surgery    Recommendation  Approval given to proceed with proposed procedure pending review of diagnostic evaluation.    Chuyita Salazar is a 78 year old, presenting for the following:  Pre-Op Exam          7/9/2024     9:38 AM   Additional Questions   Roomed by srud   Accompanied by n/a     HPI related to upcoming procedure: Blind in left eye.  Lid lag in right eye affecting field of vision.        7/9/2024   Pre-Op Questionnaire   Have you ever  had a heart attack or stroke? No   Have you ever had surgery on your heart or blood vessels, such as a stent placement, a coronary artery bypass, or surgery on an artery in your head, neck, heart, or legs? No   Do you have chest pain with activity? No   Do you have a history of heart failure? No   Do you currently have a cold, bronchitis or symptoms of other infection? No   Do you have a cough, shortness of breath, or wheezing? No   Do you or anyone in your family have previous history of blood clots? (!) YES brother   Do you or does anyone in your family have a serious bleeding problem such as prolonged bleeding following surgeries or cuts? (!) YES brother   Have you ever had problems with anemia or been told to take iron pills? No   Have you had any abnormal blood loss such as black, tarry or bloody stools, or abnormal vaginal bleeding? No   Have you ever had a blood transfusion? (!) YES   Have you ever had a transfusion reaction? (!) UNKNOWN    Are you willing to have a blood transfusion if it is medically needed before, during, or after your surgery? Yes   Have you or any of your relatives ever had problems with anesthesia? No   Do you have sleep apnea, excessive snoring or daytime drowsiness? No   Do you have any artifical heart valves or other implanted medical devices like a pacemaker, defibrillator, or continuous glucose monitor? No   Do you have artificial joints? No   Are you allergic to latex? No        Health Care Directive  Patient does not have a Health Care Directive or Living Will: Patient states has Advance Directive and will bring in a copy to clinic.    Preoperative Review of    reviewed - no record of controlled substances prescribed.      Status of Chronic Conditions:  No treated chronic problems.    Patient Active Problem List    Diagnosis Date Noted    Glaucoma 12/12/2017     Priority: Medium    Hypercholesterolemia 12/12/2017     Priority: Medium    Morbid obesity (H) 12/12/2017      "Priority: Medium    S/P cholecystectomy 12/12/2017     Priority: Medium    Primary open angle glaucoma 04/02/2015     Priority: Medium     Problem list name updated by automated process. Provider to review        Past Medical History:   Diagnosis Date    POAG (primary open-angle glaucoma)     Seasonal allergies      Past Surgical History:   Procedure Laterality Date    EYE SURGERY Bilateral     at least 3 surgeries in the last few years for glaucoma    GLAUCOMA SURGERY Right 06/07/16    bar\saeed     Current Outpatient Medications   Medication Sig Dispense Refill    bimatoprost (LUMIGAN) 0.01 % SOLN Place 1 drop into the right eye At Bedtime      brinzolamide-brimonidine (SIMBRINZA) 1-0.2 % ophthalmic suspension Place 1 drop into the right eye daily      latanoprost (XALATAN) 0.005 % ophthalmic solution Place 1 drop into both eyes 2 times daily      Multiple Vitamins-Minerals (MULTIVITAMIN OR) Take 1 tablet by mouth daily      timolol maleate (TIMOPTIC) 0.5 % ophthalmic solution Place 1 drop into the right eye every morning      pilocarpine (PILOCAR) 1 % ophthalmic solution Place 1 drop into the right eye 4 times daily      prednisoLONE acetate (PRED FORTE) 1 % ophthalmic suspension Place 1 drop into the right eye 4 times daily (Patient not taking: Reported on 7/9/2024)         No Known Allergies     Social History     Tobacco Use    Smoking status: Never     Passive exposure: Past    Smokeless tobacco: Never   Substance Use Topics    Alcohol use: Not on file     Family History   Problem Relation Age of Onset    Glaucoma No family hx of     Macular Degeneration No family hx of      History   Drug Use Not on file             Review of Systems  Constitutional, HEENT, cardiovascular, pulmonary, gi and gu systems are negative, except as otherwise noted.    Objective    /70 (BP Location: Right arm, Patient Position: Sitting)   Pulse 72   Temp 98.4  F (36.9  C) (Tympanic)   Resp 20   Ht 1.575 m (5' 2\")   Wt " "88 kg (194 lb)   LMP  (LMP Unknown)   SpO2 98%   BMI 35.48 kg/m     Estimated body mass index is 35.48 kg/m  as calculated from the following:    Height as of this encounter: 1.575 m (5' 2\").    Weight as of this encounter: 88 kg (194 lb).  Physical Exam  GENERAL: alert and no distress  EYES: Blind left eye, decreased vision right eye.  NECK: no adenopathy, no asymmetry, masses, or scars  RESP: lungs clear to auscultation - no rales, rhonchi or wheezes  CV: regular rate and rhythm, normal S1 S2, no S3 or S4, 3/6 JACKELINE heard best over RSB, click or rub, no peripheral edema  ABDOMEN: soft, nontender, no hepatosplenomegaly, no masses and bowel sounds normal  MS: no gross musculoskeletal defects noted, no edema        Diagnostics  Labs pending at this time.  Results will be reviewed when available.   No EKG required for low risk surgery (cataract, skin procedure, breast biopsy, etc).    Revised Cardiac Risk Index (RCRI)  The patient has the following serious cardiovascular risks for perioperative complications:   - No serious cardiac risks = 0 points     RCRI Interpretation: 0 points: Class I (very low risk - 0.4% complication rate)         Signed Electronically by: Rivera Hernandez MD  Copy of this evaluation report is provided to requesting physician.        "

## 2025-04-14 ENCOUNTER — TELEPHONE (OUTPATIENT)
Dept: FAMILY MEDICINE | Facility: CLINIC | Age: 80
End: 2025-04-14

## 2025-04-14 NOTE — TELEPHONE ENCOUNTER
Patient Quality Outreach    Patient is due for the following:   Physical Preventive Adult Physical    Action(s) Taken:   Schedule a Adult Preventative    Type of outreach:    Phone, left message for patient/parent to call back. and Sent letter.    Questions for provider review:    None         Candice Gaitan  Chart routed to None.